# Patient Record
Sex: MALE | Race: WHITE | NOT HISPANIC OR LATINO | URBAN - METROPOLITAN AREA
[De-identification: names, ages, dates, MRNs, and addresses within clinical notes are randomized per-mention and may not be internally consistent; named-entity substitution may affect disease eponyms.]

---

## 2017-06-19 ASSESSMENT — MIFFLIN-ST. JEOR: SCORE: 1326.29

## 2017-06-21 ENCOUNTER — SURGERY - HEALTHEAST (OUTPATIENT)
Dept: SURGERY | Facility: CLINIC | Age: 82
End: 2017-06-21

## 2017-06-29 ENCOUNTER — ANESTHESIA - HEALTHEAST (OUTPATIENT)
Dept: SURGERY | Facility: CLINIC | Age: 82
End: 2017-06-29

## 2017-06-29 ENCOUNTER — SURGERY - HEALTHEAST (OUTPATIENT)
Dept: SURGERY | Facility: CLINIC | Age: 82
End: 2017-06-29

## 2017-06-29 ASSESSMENT — MIFFLIN-ST. JEOR: SCORE: 1339.9

## 2021-05-31 VITALS — BODY MASS INDEX: 22.28 KG/M2 | HEIGHT: 68 IN | WEIGHT: 147 LBS

## 2021-05-31 VITALS — BODY MASS INDEX: 22.73 KG/M2 | HEIGHT: 68 IN | WEIGHT: 150 LBS

## 2021-06-11 NOTE — ANESTHESIA CARE TRANSFER NOTE
Patient spontaneously breathing.  Tidal volumes > 300ml.  Following commands, suctioned and extubated with balloon down.  O2 via mask at 10L.  To PACU, VSS, SBAR report to RN per institutional handoff policy and procedure.  Transfer of care.    Last vitals:   Vitals:    06/29/17 0949   BP: 173/88   Pulse: (!) 54   Resp: 12   Temp: 36.5  C (97.7  F)   SpO2: 100%     Patient's level of consciousness is drowsy  Spontaneous respirations: yes  Maintains airway independently: yes  Dentition unchanged: yes  Oropharynx: oropharynx clear of all foreign objects    QCDR Measures:  ASA# 20 - Surgical Safety Checklist: ASA20A - Safety Checks Done  PQRS# 430 - Adult PONV Prevention: 4558F - Pt received => 2 anti-emetic agents (different classes) preop & intraop  ASA# 8 - Peds PONV Prevention: 4558F - Pt received => 2 anti-emetic agents (different classes) preop & intraop  PQRS# 424 - Ana-op Temp Management: 4559F - At least one body temp DOCUMENTED => 35.5C or 95.9F within required timeframe  PQRS# 426 - PACU Transfer Protocol: - Transfer of care checklist used  ASA# 14 - Acute Post-op Pain: ASA14B - Patient did NOT experience pain >= 7 out of 10     /86

## 2021-06-11 NOTE — ANESTHESIA PREPROCEDURE EVALUATION
Anesthesia Evaluation      Patient summary reviewed   History of anesthetic complications     Airway   Mallampati: II   Pulmonary - normal exam                          Cardiovascular   (+) hypertension, ,     Rhythm: regular  Rate: normal,         Neuro/Psych    (+) CVA ,     Endo/Other       GI/Hepatic/Renal    (+)   chronic renal disease,      Other findings:                 H/O prostate cancer     S/P inguinal hernia repair    CVA (cerebral vascular accident)    HTN (hypertension)    Anxiety    Acute confusion    Atrial fibrillation with controlled ventricular response        Poor dentition      Dental    (+) poor dentition                       Anesthesia Plan  Planned anesthetic: general endotracheal  ANUM TUBE  TIVA  SEVERE PONV  DEC 10 ZOF 4    ASA 3     Anesthetic plan and risks discussed with: patient  Anesthesia plan special considerations: antiemetics,   Post-op plan: routine recovery

## 2021-06-11 NOTE — ANESTHESIA POSTPROCEDURE EVALUATION
Patient: Josephine Henaoron  REPAIR MOHS DEFECT LEFT NOSE WITH CARTILAGE GRAFT & MYOCUTANEOUS FLAP, FULL THICKNESS SKIN GRAFT LEFT UPPER ARM   Anesthesia type: general    Patient location: PACU  Last vitals:   Vitals:    06/29/17 1315   BP: (!) 187/84   Pulse: 64   Resp: 16   Temp:    SpO2: 98%     Post vital signs: stable  Level of consciousness: awake and responds to simple questions  Post-anesthesia pain: pain controlled  Post-anesthesia nausea and vomiting: no  Pulmonary: unassisted, return to baseline  Cardiovascular: stable and blood pressure at baseline  Hydration: adequate  Anesthetic events: no    QCDR Measures:  ASA# 11 - Ana-op Cardiac Arrest: ASA11B - Patient did NOT experience unanticipated cardiac arrest  ASA# 12 - Ana-op Mortality Rate: ASA12B - Patient did NOT die  ASA# 13 - PACU Re-Intubation Rate: ASA13B - Patient did NOT require a new airway mgmt  ASA# 10 - Composite Anes Safety: ASA10A - No serious adverse event  ASA# 38 - New Corneal Injury: ASA38A - No new exposure keratitis or corneal abrasion in PACU    Additional Notes: He remains hypertensive because he skipped his am anti-HTN meds. After metoprolol and labetalol here, we have achieved 170s systolic. He should resume his regular meds when he reaches home.